# Patient Record
Sex: MALE | URBAN - METROPOLITAN AREA
[De-identification: names, ages, dates, MRNs, and addresses within clinical notes are randomized per-mention and may not be internally consistent; named-entity substitution may affect disease eponyms.]

---

## 2022-02-11 ENCOUNTER — OFFICE VISIT (OUTPATIENT)
Dept: INTERNAL MEDICINE CLINIC | Age: 32
End: 2022-02-11
Payer: COMMERCIAL

## 2022-02-11 VITALS
DIASTOLIC BLOOD PRESSURE: 78 MMHG | OXYGEN SATURATION: 97 % | SYSTOLIC BLOOD PRESSURE: 119 MMHG | HEIGHT: 76 IN | TEMPERATURE: 97.9 F | BODY MASS INDEX: 26.28 KG/M2 | HEART RATE: 93 BPM | RESPIRATION RATE: 22 BRPM | WEIGHT: 215.8 LBS

## 2022-02-11 DIAGNOSIS — R06.02 SOB (SHORTNESS OF BREATH): ICD-10-CM

## 2022-02-11 DIAGNOSIS — Z76.89 ESTABLISHING CARE WITH NEW DOCTOR, ENCOUNTER FOR: Primary | ICD-10-CM

## 2022-02-11 DIAGNOSIS — R53.81 MALAISE AND FATIGUE: ICD-10-CM

## 2022-02-11 DIAGNOSIS — U07.1 COVID: ICD-10-CM

## 2022-02-11 DIAGNOSIS — R53.83 MALAISE AND FATIGUE: ICD-10-CM

## 2022-02-11 PROCEDURE — 71046 X-RAY EXAM CHEST 2 VIEWS: CPT | Performed by: FAMILY MEDICINE

## 2022-02-11 PROCEDURE — 99204 OFFICE O/P NEW MOD 45 MIN: CPT | Performed by: FAMILY MEDICINE

## 2022-02-11 NOTE — PROGRESS NOTES
Chief Complaint   Patient presents with    Post-COVID Symptoms     still fatigued and weak, sob    Establish Care     Visit Vitals  /78   Pulse 93   Temp 97.9 °F (36.6 °C) (Oral)   Resp 22   Ht 6' 4\" (1.93 m)   Wt 215 lb 12.8 oz (97.9 kg)   SpO2 97%   BMI 26.27 kg/m²     1. Have you been to the ER, urgent care clinic since your last visit? Hospitalized since your last visit? No    2. Have you seen or consulted any other health care providers outside of the [address] [place] [address] since your last visit? Include any pap smears or colon screening.  No

## 2022-02-11 NOTE — PROGRESS NOTES
[facility]  Gamaliel Amanda MD  [address] [address]    Chief Complaint   Patient presents with    Post-COVID Symptoms     still fatigued and weak, sob    Establish Care       SUBJECTIVE:    Horacio Lewis is a 36 y.o. male care establishment and COVID-19 infection follow-up. Past medical history is unremarkable for chronic disease.     First covid symptoms developed january 10 of this year.  +covid testing  january 11  Anosmia, dysgeusia x 3 days, resolved  Chills, fever resolved after 1 week, t max 102F  No urgent care visit or other follow up    MAGANA+   ND up to 150 while using vacuum,   Home pulse ox 97%  Wt loss 20 lb+ since January 1  Poor appetite +  Fatigue+    Not getting better     No chronic cardiopulmonary disease history    Etoh+ 1-2 wk b4 covid  No tobacco  Drugs-neg    HS teacher       Past Medical History:   Diagnosis Date    Contact dermatitis and eczema due to cause     H/O Osgood-Schlatter disease     right    Headache     more so since getting covid 19     Past Surgical History:   Procedure Laterality Date    HX WISDOM TEETH EXTRACTION Bilateral 2009    lower 2 only     No Known Allergies   Muscles fatigue/sore    REVIEW OF SYSTEMS:  General: negative for - chills or fever  ENT: - headaches-general pounding in parietal area (bilat) , nasal congestion,  sore throat-last yesterday, intermittent  negative for tinnitus, hearing loss, vision changes,  Respiratory: cough-dry and sometimes productive of yellow sputum-negative for - , hemoptysis, shortness of breath at rset,   Cardiovascular : negative for - chest pain, edema, palpitations or shortness of breath  Gastrointestinal:nausea, diarrhea-Last watery BM 1 week ago, appetite loss, sore abd muscles; negative for - abdominal pain, blood in stools, heartburn or /vomiting,, constipation  Genito-Urinary: + muscle aches; no dysuria, trouble voiding, hematuria or erectile dysfunction  Musculoskeletal: negative for - gait disturbance, joint pain, joint stiffness , joint swelling,  Neurological: no TIA or stroke symptoms  Hematologic: no bruises, no bleeding, no swollen glands  Integument: no lumps, mole changes, nail changes or rash  Endocrine:+lethargy, unexpected weight changes, lost 25 lb since jan 1      Social History     Socioeconomic History    Marital status: SINGLE   Tobacco Use    Smoking status: Never Smoker    Smokeless tobacco: Never Used   Vaping Use    Vaping Use: Never used   Substance and Sexual Activity    Drug use: Not Currently     Types: Marijuana    Sexual activity: Yes     Partners: Female     Birth control/protection: Pill     Family History   Problem Relation Age of Onset    Cancer Mother         skin    Cancer Father         skin    Alzheimer's Disease Maternal Grandmother     Cancer Maternal Grandmother     Diabetes Maternal Grandmother        OBJECTIVE:     Visit Vitals  /78   Pulse 93   Temp 97.9 °F (36.6 °C) (Oral)   Resp 22   Ht 6' 4\" (1.93 m)   Wt 215 lb 12.8 oz (97.9 kg)   SpO2 97%   BMI 26.27 kg/m²     CONSTITUTIONAL: well developed, well nourished, no acute distress  EYES: perrla, eom intact  ENMT:moist mucous membranes, pharynx clear , cerumen, nares patent, sinus nontender  NECK: supple. Thyroid normal  RESPIRATORY: Chest: clear bilaterally  CARDIOVASCULAR: Heart: regular rate and rhythm pulse 2+ no jvd, no carotid bruit  GASTROINTESTINAL: Abdomen:nondistended,  bowel sounds active, soft,  HEMATOLOGIC: no pathological lymph nodes palpated  MUSCULOSKELETAL: Extremities: no edema, no cords  INTEGUMENT:warm and dry. No unusual rashes or suspicious skin lesions noted on visible skin. Nails appear normal.  NEUROLOGIC: non-focal exam   MENTAL STATUS: alert and oriented, appropriate affect     ASSESSMENT/PLAN[de-identified]   1. Establishing care with new doctor, encounter for    2. COVID    3. SOB (shortness of breath)    4.  Malaise and fatigue      Consider cardiopulmonary complications of Covid to include pneumonia, PE, cardiomyopathy, rhythm abnormalities. .  Orders Placed This Encounter    XR CHEST PA LAT    CBC WITH AUTOMATED DIFF    METABOLIC PANEL, COMPREHENSIVE    URINALYSIS W/ RFLX MICROSCOPIC    D DIMER    NT-PRO BNP    TSH 3RD GENERATION    SED RATE (ESR)     I have discussed the diagnosis with the patient and the intended plan as seen in the  orders above. The patient understands and agrees with the plan. The patient has   received an after visit summary. Questions were answered concerning  future plans  Patient labs and/or xrays were reviewed as available. Past records were reviewed as available. Counseled regarding  healthy lifestyle          Advised patient to proceed to urgent care, call back or return to office if symptoms develop/worsen/change/persist.  Discussed expected course/resolution/complications of diagnosis in detail with patient. Cassie Connelly M.D. This note was created using voice recognition software.   Edits have been made but syntax errors might exist.

## 2022-02-12 LAB
ALBUMIN SERPL-MCNC: 4.7 G/DL (ref 4.1–5.2)
ALBUMIN/GLOB SERPL: 2 {RATIO} (ref 1.2–2.2)
ALP SERPL-CCNC: 56 IU/L (ref 44–121)
ALT SERPL-CCNC: 29 IU/L (ref 0–44)
APPEARANCE UR: CLEAR
AST SERPL-CCNC: 15 IU/L (ref 0–40)
BASOPHILS # BLD AUTO: 0 X10E3/UL (ref 0–0.2)
BASOPHILS NFR BLD AUTO: 1 %
BILIRUB SERPL-MCNC: 0.2 MG/DL (ref 0–1.2)
BILIRUB UR QL STRIP: NEGATIVE
BUN SERPL-MCNC: 14 MG/DL (ref 6–20)
BUN/CREAT SERPL: 16 (ref 9–20)
CALCIUM SERPL-MCNC: 9.2 MG/DL (ref 8.7–10.2)
CHLORIDE SERPL-SCNC: 103 MMOL/L (ref 96–106)
CO2 SERPL-SCNC: 21 MMOL/L (ref 20–29)
COLOR UR: YELLOW
CREAT SERPL-MCNC: 0.89 MG/DL (ref 0.76–1.27)
EOSINOPHIL # BLD AUTO: 0.1 X10E3/UL (ref 0–0.4)
EOSINOPHIL NFR BLD AUTO: 2 %
ERYTHROCYTE [DISTWIDTH] IN BLOOD BY AUTOMATED COUNT: 12.9 % (ref 11.6–15.4)
ERYTHROCYTE [SEDIMENTATION RATE] IN BLOOD BY WESTERGREN METHOD: 2 MM/HR (ref 0–15)
GLOBULIN SER CALC-MCNC: 2.3 G/DL (ref 1.5–4.5)
GLUCOSE SERPL-MCNC: 109 MG/DL (ref 65–99)
GLUCOSE UR QL STRIP: NEGATIVE
HCT VFR BLD AUTO: 39.6 % (ref 37.5–51)
HGB BLD-MCNC: 13.5 G/DL (ref 13–17.7)
HGB UR QL STRIP: NEGATIVE
IMM GRANULOCYTES # BLD AUTO: 0 X10E3/UL (ref 0–0.1)
IMM GRANULOCYTES NFR BLD AUTO: 0 %
KETONES UR QL STRIP: NEGATIVE
LEUKOCYTE ESTERASE UR QL STRIP: NEGATIVE
LYMPHOCYTES # BLD AUTO: 1.9 X10E3/UL (ref 0.7–3.1)
LYMPHOCYTES NFR BLD AUTO: 30 %
MCH RBC QN AUTO: 29.9 PG (ref 26.6–33)
MCHC RBC AUTO-ENTMCNC: 34.1 G/DL (ref 31.5–35.7)
MCV RBC AUTO: 88 FL (ref 79–97)
MICRO URNS: NORMAL
MONOCYTES # BLD AUTO: 0.4 X10E3/UL (ref 0.1–0.9)
MONOCYTES NFR BLD AUTO: 7 %
NEUTROPHILS # BLD AUTO: 3.9 X10E3/UL (ref 1.4–7)
NEUTROPHILS NFR BLD AUTO: 60 %
NITRITE UR QL STRIP: NEGATIVE
NT-PROBNP SERPL-MCNC: 23 PG/ML (ref 0–86)
PH UR STRIP: 5.5 [PH] (ref 5–7.5)
PLATELET # BLD AUTO: 217 X10E3/UL (ref 150–450)
POTASSIUM SERPL-SCNC: 4.3 MMOL/L (ref 3.5–5.2)
PROT SERPL-MCNC: 7 G/DL (ref 6–8.5)
PROT UR QL STRIP: NEGATIVE
RBC # BLD AUTO: 4.51 X10E6/UL (ref 4.14–5.8)
SODIUM SERPL-SCNC: 139 MMOL/L (ref 134–144)
SP GR UR STRIP: 1.03 (ref 1–1.03)
TSH SERPL DL<=0.005 MIU/L-ACNC: 1.51 UIU/ML (ref 0.45–4.5)
UROBILINOGEN UR STRIP-MCNC: 0.2 MG/DL (ref 0.2–1)
WBC # BLD AUTO: 6.4 X10E3/UL (ref 3.4–10.8)

## 2022-02-15 LAB — D DIMER PPP FEU-MCNC: <0.2 MG/L FEU (ref 0–0.49)

## 2022-02-16 DIAGNOSIS — Z13.220 LIPID SCREENING: Primary | ICD-10-CM

## 2022-02-16 DIAGNOSIS — R73.09 ELEVATED GLUCOSE: ICD-10-CM

## 2022-05-04 ENCOUNTER — OFFICE VISIT (OUTPATIENT)
Dept: INTERNAL MEDICINE CLINIC | Age: 32
End: 2022-05-04
Payer: COMMERCIAL

## 2022-05-04 VITALS
HEART RATE: 113 BPM | RESPIRATION RATE: 14 BRPM | SYSTOLIC BLOOD PRESSURE: 131 MMHG | BODY MASS INDEX: 25.89 KG/M2 | OXYGEN SATURATION: 97 % | DIASTOLIC BLOOD PRESSURE: 75 MMHG | HEIGHT: 76 IN | WEIGHT: 212.6 LBS | TEMPERATURE: 98.9 F

## 2022-05-04 DIAGNOSIS — K58.0 IRRITABLE BOWEL SYNDROME WITH DIARRHEA: ICD-10-CM

## 2022-05-04 DIAGNOSIS — U09.9 LONG COVID: ICD-10-CM

## 2022-05-04 DIAGNOSIS — F32.A DEPRESSION, UNSPECIFIED DEPRESSION TYPE: ICD-10-CM

## 2022-05-04 DIAGNOSIS — Z76.89 ENCOUNTER TO ESTABLISH CARE: Primary | ICD-10-CM

## 2022-05-04 DIAGNOSIS — R53.83 FATIGUE, UNSPECIFIED TYPE: ICD-10-CM

## 2022-05-04 PROCEDURE — 99204 OFFICE O/P NEW MOD 45 MIN: CPT | Performed by: INTERNAL MEDICINE

## 2022-05-04 NOTE — PROGRESS NOTES
Hermilo Arnold is a 36 y.o. male  Chief Complaint   Patient presents with    Establish Care     Visit Vitals  /75 (BP 1 Location: Left upper arm, BP Patient Position: Sitting, BP Cuff Size: Adult)   Pulse (!) 113   Temp 98.9 °F (37.2 °C) (Temporal)   Resp 14   Ht 6' 4\" (1.93 m)   Wt 212 lb 9.6 oz (96.4 kg)   SpO2 97%   BMI 25.88 kg/m²          HPI  Mr. Kenan Bailey is a 36years old her to establish care and [facility] paperwork. 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 He has lingering fatigues, shortness of breath, poor appetite which he uses marijuana intermittently for. He is depressed but he attributes it to being deconditioned and his long covid syndrome. Denies cough, fever, chest pain. He reports abdominal pain and diarrhea frequently, once a day and this affect the teaching process as he has urgency. No blood or mucous with stool. [facility] PHQ 9 Score: 5 (5/4/2022 10:44 AM)  Thoughts of being better off dead, or hurting yourself in some way: 0 (5/4/2022 10:44 AM)    Patient Active Problem List   Diagnosis Code    H/O Osgood-Schlatter disease Z87.39       OBJECTIVE:  Visit Vitals  /75 (BP 1 Location: Left upper arm, BP Patient Position: Sitting, BP Cuff Size: Adult)   Pulse (!) 113   Temp 98.9 °F (37.2 °C) (Temporal)   Resp 14   Ht 6' 4\" (1.93 m)   Wt 212 lb 9.6 oz (96.4 kg)   SpO2 97%   BMI 25.88 kg/m²      . Social History     Socioeconomic History    Marital status: SINGLE   Tobacco Use    Smoking status: Never Smoker    Smokeless tobacco: Never Used   Vaping Use    Vaping Use: Never used   Substance and Sexual Activity    Drug use: Not Currently     Types: Marijuana    Sexual activity: Yes     Partners: Female     Birth control/protection: Pill      .   Past Medical History:   Diagnosis Date    Contact dermatitis and eczema due to cause     H/O Osgood-Schlatter disease     right    Headache     more so since getting covid 19        No Known Allergies       ROS  Review of Systems   All other systems reviewed and are negative. EXAM  Physical Exam  Constitutional:       General: He is not in acute distress. Appearance: Normal appearance. He is not toxic-appearing. HENT:      Head: Normocephalic and atraumatic. Nose: No congestion or rhinorrhea. Eyes:      General:         Right eye: No discharge. Extraocular Movements: Extraocular movements intact. Cardiovascular:      Rate and Rhythm: Normal rate and regular rhythm. Heart sounds: Normal heart sounds. No murmur heard. No gallop. Pulmonary:      Effort: Pulmonary effort is normal. No respiratory distress. Breath sounds: Normal breath sounds. No wheezing or rales. Abdominal:      General: There is no distension. Palpations: Abdomen is soft. There is no mass. Tenderness: There is no abdominal tenderness. There is no right CVA tenderness, left CVA tenderness, guarding or rebound. Hernia: No hernia is present. Musculoskeletal:         General: No swelling or deformity. Right lower leg: No edema. Skin:     Coloration: Skin is not jaundiced. Findings: No bruising or lesion. Neurological:      General: No focal deficit present. Mental Status: He is alert and oriented to person, place, and time. Motor: No weakness. Psychiatric:         Mood and Affect: Mood normal.         Health Maintenance Due   Topic Date Due    Hepatitis C Screening  Never done    DTaP/Tdap/Td series (1 - Tdap) 09/04/2016    COVID-19 Vaccine (3 - Booster for Moderna series) 02/11/2022       ASSESSMENT/PLAN    Diagnoses and all orders for this visit:    1. Encounter to establish care    2. Long COVID  -     TSH 3RD GENERATION; Future  -     T4 (THYROXINE); Future    3. Irritable bowel syndrome with diarrhea  -     TSH 3RD GENERATION; Future  -     T4 (THYROXINE); Future    4. Fatigue, unspecified type  -     CBC WITH AUTOMATED DIFF;  Future  - METABOLIC PANEL, COMPREHENSIVE; Future  -     TSH 3RD GENERATION; Future  -     T4 (THYROXINE); Future  -     LIPID PANEL; Future  -     VITAMIN D, 25 HYDROXY; Future    5. Depression, unspecified depression type  -     TSH 3RD GENERATION; Future  -     T4 (THYROXINE); Future  -     LIPID PANEL;  Future  -     VITAMIN D, 25 HYDROXY; Future    FMLA paper filled up   I donnot believe the diarrhea frequency will interfere with his job   He appears to have long covid like syndrome, will need psychotherapy and possibly physical therapy   Exercise as tolerated to improve function             Janiya Farley MD

## 2022-05-04 NOTE — PATIENT INSTRUCTIONS
Diet for Irritable Bowel Syndrome: Care Instructions  Overview     Irritable bowel syndrome, or IBS, is a problem with the intestines. IBS can cause belly pain, bloating, gas, constipation, and diarrhea. Most people can control their symptoms by changing their diet and easing stress. No specific foods cause everyone with IBS to have symptoms. Many people find that they feel better by limiting or eliminating foods that may bring on symptoms. Make sure you don't stop eating all foods from any one food group without talking with a dietitian. You need to make sure you are still getting all the nutrients you need. Follow-up care is a key part of your treatment and safety. Be sure to make and go to all appointments, and call your doctor if you are having problems. It's also a good idea to know your test results and keep a list of the medicines you take. How can you care for yourself at home? To reduce constipation  · Check with your doctor about increasing the amount of fiber you eat. If your doctor recommends more fiber:  ? Slowly increase the amount of fiber you eat. For some people who have IBS, eating more fiber may make some symptoms worse. This includes bloating. Adding fiber slowly may help you avoid these problems. ? Include fruits, vegetables, beans, and whole grains in your diet each day. These foods are high in fiber. ? Take a fiber supplement, such as Citrucel or Metamucil, every day if needed. Read and follow all instructions on the label. · Drink plenty of fluids. If you have kidney, heart, or liver disease and have to limit fluids, talk with your doctor before you increase the amount of fluids you drink. · Get some exercise every day. Build up slowly to 30 to 60 minutes a day on 5 or more days of the week. · Schedule time each day for a bowel movement. Having a daily routine may help. Take your time and do not strain when having a bowel movement.   To reduce diarrhea  You may try giving up foods or drinks one at a time to see whether symptoms improve. Limit or avoid the following:  · Alcohol  · Caffeine, which is found in coffee, tea, cola drinks, and chocolate  · Nicotine, from smoking or chewing tobacco  · Gas-producing foods, such as beans, broccoli, cabbage, and apples  · Dairy products that contain lactose (milk sugar), such as ice cream and milk. · Foods and drinks high in sugar, especially fruit juice, soda, candy, and other packaged sweets (such as cookies)  · Foods high in fat, including garg, sausage, butter, oils, and anything deep-fried  · Sorbitol and xylitol, artificial sweeteners found in some sugarless candies and chewing gum  Keep track of foods  · Some people with IBS use a daily food diary to keep track of what they eat and whether they have any symptoms after eating certain foods. The diary also can be a good way to record what is going on in your life. · Stress plays a role in IBS. So if you are aware that certain stresses bring on symptoms, you can try to reduce those stresses. Keep mealtimes pleasant  · Try to maintain a pleasant environment when you eat. This may reduce stress that can make symptoms likely to occur. · Give yourself plenty of time to eat, rather than eating on the go. Chew your food slowly. Try not to swallow air, which can cause bloating. Where can you learn more? Go to http://www.gray.com/  Enter G529 in the search box to learn more about \"Diet for Irritable Bowel Syndrome: Care Instructions. \"  Current as of: September 8, 2021               Content Version: 13.2  © 1723-7323 [employer]. Care instructions adapted under license by [employer] (which disclaims liability or warranty for this information).  If you have questions about a medical condition or this instruction, always ask your healthcare professional. [address] any warranty or liability for your use of this information.

## 2022-05-04 NOTE — PROGRESS NOTES
Reviewed record in preparation for visit and have obtained necessary documentation. Identified pt with two pt identifiers(name and ). Chief Complaint   Patient presents with    Establish Care     There were no vitals filed for this visit. Health Maintenance Due   Topic Date Due    Hepatitis C Test  Never done    COVID-19 Vaccine (1) Never done    DTaP/Tdap/Td  (1 - Tdap) 2016       Mr. Meme Rose has a reminder for a \"due or due soon\" health maintenance. I have asked that he discuss this further with his primary care provider for follow-up on this health maintenance. Coordination of Care Questionnaire:  :     1) Have you been to an emergency room, urgent care clinic since your last visit? Hospitalized since your la    If the patient is female:  4. For patients aged 41-77: Has the patient had a mammogram within the past 2 years? 5. For patients aged 21-65: Has the patient had a pap smear? In the event something were to happen to you and you were unable to speak on your behalf, do you have an Advance Directive/ Living Will in place stating your wishes? Do you have an Advance Directive on file? 6) Are you interested in receiving information on Advance Directives? Patient is accompanied by self I have received verbal consent from Trevin Bradshaw to discuss any/all medical information while they are present in the room.

## 2022-10-13 ENCOUNTER — TELEPHONE (OUTPATIENT)
Dept: INTERNAL MEDICINE CLINIC | Age: 32
End: 2022-10-13